# Patient Record
Sex: FEMALE | Race: WHITE | NOT HISPANIC OR LATINO | URBAN - METROPOLITAN AREA
[De-identification: names, ages, dates, MRNs, and addresses within clinical notes are randomized per-mention and may not be internally consistent; named-entity substitution may affect disease eponyms.]

---

## 2017-08-01 RX ORDER — NAPROXEN 500 MG/1
TABLET ORAL
Qty: 60 TABLET | Refills: 2 | OUTPATIENT
Start: 2017-08-01

## 2019-07-17 ENCOUNTER — APPOINTMENT (OUTPATIENT)
Dept: WOMENS IMAGING | Facility: HOSPITAL | Age: 39
End: 2019-07-17

## 2019-07-17 PROCEDURE — 77067 SCR MAMMO BI INCL CAD: CPT | Performed by: RADIOLOGY

## 2020-01-23 ENCOUNTER — OFFICE VISIT (OUTPATIENT)
Dept: SPORTS MEDICINE | Facility: CLINIC | Age: 40
End: 2020-01-23

## 2020-01-23 VITALS
HEIGHT: 63 IN | DIASTOLIC BLOOD PRESSURE: 70 MMHG | OXYGEN SATURATION: 98 % | WEIGHT: 119 LBS | BODY MASS INDEX: 21.09 KG/M2 | SYSTOLIC BLOOD PRESSURE: 110 MMHG | HEART RATE: 62 BPM

## 2020-01-23 DIAGNOSIS — G56.02 CARPAL TUNNEL SYNDROME OF LEFT WRIST: Primary | ICD-10-CM

## 2020-01-23 PROCEDURE — 99204 OFFICE O/P NEW MOD 45 MIN: CPT | Performed by: FAMILY MEDICINE

## 2020-01-23 RX ORDER — NAPROXEN 500 MG/1
500 TABLET ORAL 2 TIMES DAILY WITH MEALS
Qty: 60 TABLET | Refills: 2 | Status: SHIPPED | OUTPATIENT
Start: 2020-01-23 | End: 2020-04-14

## 2020-01-23 NOTE — PROGRESS NOTES
"Christi is a 39 y.o. year old female evaluation of a problem that is new to this examiner.    Chief Complaint   Patient presents with   • LT arm pain       History of Present Illness  HPI   Here today for left hand numbness.  She has baseline history of intermittent numbness and tingling in both hands, primarily present when she wakes up in the morning.  Yesterday she had a more aggressive workout with dumbbell cleans, hand rowing, has been working on increasing her  strength.  Last night developed increased numbness and pain in left wrist and hand radiating up the forearm.  Associated with some reduction in  strength and dexterity.  Moderately severe.  Seems to be improving somewhat in the past hour.  Denies specific injury.    I have reviewed the patient's medical, family, and social history in detail and updated the computerized patient record.    Review of Systems   Constitutional: Negative for fever.   Musculoskeletal:        Per HPI   Skin: Negative for rash and wound.   Neurological: Positive for weakness and numbness. Negative for tremors.   All other systems reviewed and are negative.      /70   Pulse 62   Ht 160 cm (62.99\")   Wt 54 kg (119 lb)   SpO2 98%   BMI 21.09 kg/m²      Physical Exam    Vital signs reviewed.   General: No acute distress.  Eyes: conjunctiva clear; pupils equally round and reactive  ENT: external ears and nose atraumatic; oropharynx clear  CV: no peripheral edema, 2+ distal pulses  Resp: normal respiratory effort, no use of accessory muscles  Skin: no rashes or wounds; normal turgor  Psych: mood and affect appropriate; recent and remote memory intact  Neuro: sensation to light touch intact bilateral hands.  Strength is normal and symmetric bilateral hands with , opposition, abduction    MSK Exam:  Ortho Exam  Left hand and wrist: No tenderness.  Normal appearance without visible swelling.  There is a distinctly positive Tinel at the carpal tunnel.  Positive " Phalen at 30 seconds.  Negative Tinel at the cubital tunnel.  Mild generalized soreness in the extensor and flexor bundles of the forearm.  Normal range of motion and strength of the hand and wrist.    Right hand and wrist has a very mildly positive Tinel at the carpal tunnel.  Negative Phalen at 1 minute.        Diagnoses and all orders for this visit:    Carpal tunnel syndrome of left wrist  -     naproxen (NAPROSYN) 500 MG tablet; Take 1 tablet by mouth 2 (Two) Times a Day With Meals.    Discussed the nature of carpal tunnel syndrome.  This appears to be acute based on overuse, likely has some Stacie synovitis in the flexor bundle of the carpal tunnel causing this.  We will treated with bracing and NSAIDs.  If she is not progressing rapidly will consider nerve conduction study, possible injection.      EMR Dragon/Transcription disclaimer:    Much of this encounter note is an electronic transcription/translation of spoken language to printed text.  The electronic translation of spoken language may permit erroneous, or at times, nonsensical words or phrases to be inadvertently transcribed.  Although I have reviewed the note for such errors some may still exist.

## 2020-02-17 ENCOUNTER — TELEPHONE (OUTPATIENT)
Dept: SPORTS MEDICINE | Facility: CLINIC | Age: 40
End: 2020-02-17

## 2020-02-17 NOTE — TELEPHONE ENCOUNTER
Pt called and stated that her apt was rescheduled from Friday and would like to know what her next steps are. ASHLEE

## 2020-02-27 NOTE — TELEPHONE ENCOUNTER
I am sorry that I was ill and we had to reschedule her appointment.  Next steps really depend on her progress over time.  If she is still having symptoms, we may need to do some further testing or perform an injection next.  If it is possible to work her in sooner by using an acute visit slot or double booking a physical, I am okay with that.

## 2020-04-14 DIAGNOSIS — G56.02 CARPAL TUNNEL SYNDROME OF LEFT WRIST: ICD-10-CM

## 2020-04-14 RX ORDER — NAPROXEN 500 MG/1
TABLET ORAL
Qty: 180 TABLET | Refills: 3 | Status: SHIPPED | OUTPATIENT
Start: 2020-04-14

## 2020-08-17 ENCOUNTER — TELEPHONE (OUTPATIENT)
Dept: SPORTS MEDICINE | Facility: CLINIC | Age: 40
End: 2020-08-17

## 2020-08-17 NOTE — TELEPHONE ENCOUNTER
Patient called in, wants to know if we can add the COVID antibody testing to her CPE labs she is having done on 09/03/2020?.      Thanks  Deborah

## 2020-08-17 NOTE — TELEPHONE ENCOUNTER
Patient called to make an appointment for a physical, the only visits I see are for sports medicine. She stated that she asked you to be her primary care doctor at one of her appointments years ago.   Will you see her for primary care?  Please advise, thank you.

## 2020-09-01 DIAGNOSIS — Z00.00 PREVENTATIVE HEALTH CARE: Primary | ICD-10-CM

## 2020-09-04 LAB
ALBUMIN SERPL-MCNC: 4.5 G/DL (ref 3.5–5.2)
ALBUMIN/GLOB SERPL: 3 G/DL
ALP SERPL-CCNC: 37 U/L (ref 39–117)
ALT SERPL-CCNC: 9 U/L (ref 1–33)
APPEARANCE UR: ABNORMAL
AST SERPL-CCNC: 13 U/L (ref 1–32)
BACTERIA #/AREA URNS HPF: ABNORMAL /HPF
BASOPHILS # BLD AUTO: 0.02 10*3/MM3 (ref 0–0.2)
BASOPHILS NFR BLD AUTO: 0.5 % (ref 0–1.5)
BILIRUB SERPL-MCNC: 0.5 MG/DL (ref 0–1.2)
BILIRUB UR QL STRIP: NEGATIVE
BUN SERPL-MCNC: 16 MG/DL (ref 6–20)
BUN/CREAT SERPL: 19.5 (ref 7–25)
CALCIUM SERPL-MCNC: 9.1 MG/DL (ref 8.6–10.5)
CHLORIDE SERPL-SCNC: 105 MMOL/L (ref 98–107)
CHOLEST SERPL-MCNC: 178 MG/DL (ref 0–200)
CHOLEST/HDLC SERPL: 2.54 {RATIO}
CO2 SERPL-SCNC: 23.5 MMOL/L (ref 22–29)
COLOR UR: YELLOW
CREAT SERPL-MCNC: 0.82 MG/DL (ref 0.57–1)
CRYSTALS URNS MICRO: ABNORMAL
EOSINOPHIL # BLD AUTO: 0.06 10*3/MM3 (ref 0–0.4)
EOSINOPHIL NFR BLD AUTO: 1.5 % (ref 0.3–6.2)
EPI CELLS #/AREA URNS HPF: >10 /HPF (ref 0–10)
ERYTHROCYTE [DISTWIDTH] IN BLOOD BY AUTOMATED COUNT: 12.8 % (ref 12.3–15.4)
GLOBULIN SER CALC-MCNC: 1.5 GM/DL
GLUCOSE SERPL-MCNC: 91 MG/DL (ref 65–99)
GLUCOSE UR QL: NEGATIVE
HCT VFR BLD AUTO: 34.4 % (ref 34–46.6)
HDLC SERPL-MCNC: 70 MG/DL (ref 40–60)
HGB BLD-MCNC: 11.9 G/DL (ref 12–15.9)
HGB UR QL STRIP: NEGATIVE
IMM GRANULOCYTES # BLD AUTO: 0 10*3/MM3 (ref 0–0.05)
IMM GRANULOCYTES NFR BLD AUTO: 0 % (ref 0–0.5)
KETONES UR QL STRIP: NEGATIVE
LDLC SERPL CALC-MCNC: 98 MG/DL (ref 0–100)
LEUKOCYTE ESTERASE UR QL STRIP: NEGATIVE
LYMPHOCYTES # BLD AUTO: 1.52 10*3/MM3 (ref 0.7–3.1)
LYMPHOCYTES NFR BLD AUTO: 37 % (ref 19.6–45.3)
MCH RBC QN AUTO: 31.6 PG (ref 26.6–33)
MCHC RBC AUTO-ENTMCNC: 34.6 G/DL (ref 31.5–35.7)
MCV RBC AUTO: 91.2 FL (ref 79–97)
MICRO URNS: ABNORMAL
MICRO URNS: ABNORMAL
MONOCYTES # BLD AUTO: 0.34 10*3/MM3 (ref 0.1–0.9)
MONOCYTES NFR BLD AUTO: 8.3 % (ref 5–12)
MUCOUS THREADS URNS QL MICRO: PRESENT /HPF
NEUTROPHILS # BLD AUTO: 2.17 10*3/MM3 (ref 1.7–7)
NEUTROPHILS NFR BLD AUTO: 52.7 % (ref 42.7–76)
NITRITE UR QL STRIP: NEGATIVE
NRBC BLD AUTO-RTO: 0 /100 WBC (ref 0–0.2)
PH UR STRIP: 7.5 [PH] (ref 5–7.5)
PLATELET # BLD AUTO: 274 10*3/MM3 (ref 140–450)
POTASSIUM SERPL-SCNC: 4.8 MMOL/L (ref 3.5–5.2)
PROT SERPL-MCNC: 6 G/DL (ref 6–8.5)
PROT UR QL STRIP: ABNORMAL
RBC # BLD AUTO: 3.77 10*6/MM3 (ref 3.77–5.28)
RBC #/AREA URNS HPF: ABNORMAL /HPF (ref 0–2)
SARS-COV-2 AB SERPL QL IA: NEGATIVE
SODIUM SERPL-SCNC: 138 MMOL/L (ref 136–145)
SP GR UR: 1.02 (ref 1–1.03)
TRIGL SERPL-MCNC: 50 MG/DL (ref 0–150)
TSH SERPL DL<=0.005 MIU/L-ACNC: 2.22 UIU/ML (ref 0.45–4.5)
UNIDENT CRYS URNS QL MICRO: PRESENT /LPF
URINALYSIS REFLEX: ABNORMAL
UROBILINOGEN UR STRIP-MCNC: 0.2 MG/DL (ref 0.2–1)
VLDLC SERPL CALC-MCNC: 10 MG/DL
WBC # BLD AUTO: 4.11 10*3/MM3 (ref 3.4–10.8)
WBC #/AREA URNS HPF: ABNORMAL /HPF (ref 0–5)

## 2020-09-08 ENCOUNTER — OFFICE VISIT (OUTPATIENT)
Dept: SPORTS MEDICINE | Facility: CLINIC | Age: 40
End: 2020-09-08

## 2020-09-08 VITALS
OXYGEN SATURATION: 99 % | DIASTOLIC BLOOD PRESSURE: 60 MMHG | TEMPERATURE: 98.4 F | SYSTOLIC BLOOD PRESSURE: 115 MMHG | HEART RATE: 62 BPM | HEIGHT: 63 IN | BODY MASS INDEX: 21.79 KG/M2 | WEIGHT: 123 LBS

## 2020-09-08 DIAGNOSIS — Z00.00 ANNUAL PHYSICAL EXAM: Primary | ICD-10-CM

## 2020-09-08 PROBLEM — M43.17 SPONDYLOLISTHESIS AT L5-S1 LEVEL: Status: ACTIVE | Noted: 2020-09-08

## 2020-09-08 PROCEDURE — 99396 PREV VISIT EST AGE 40-64: CPT | Performed by: FAMILY MEDICINE

## 2020-09-08 NOTE — PROGRESS NOTES
"Christi Joseph is here today for an annual physical exam.     Eating a healthy diet. Exercising routinely.   Thinks she had covid about 6 weeks ago - direct contact with neighbor who was hospitalized; 7-10 days later developed fatigue, headaches, brain fog, but no cough/SOA. Rest of family tested negative.  had similar illness.     PHQ-2 Depression Screening  Little interest or pleasure in doing things? 0   Feeling down, depressed, or hopeless? 0   PHQ-2 Total Score 0       Health Maintenance   Topic Date Due   • Annual Gynecologic Pelvic and Breast Exam  1980   • ANNUAL PHYSICAL  12/23/1981   • TDAP/TD VACCINES (1 - Tdap) 12/23/1989   • HEPATITIS C SCREENING  01/23/2020   • PAP SMEAR  01/23/2020   • INFLUENZA VACCINE  08/01/2020       Review of Systems   Constitutional: Negative.    Respiratory: Negative.    Cardiovascular: Negative.        /60 (BP Location: Right arm, Patient Position: Sitting, Cuff Size: Adult)   Pulse 62   Temp 98.4 °F (36.9 °C)   Ht 160 cm (62.99\")   Wt 55.8 kg (123 lb)   SpO2 99%   BMI 21.79 kg/m²      Physical Exam    Vital signs reviewed.  General appearance: No acute distress  Eyes: conjunctiva clear without erythema; pupils equally round and reactive  ENT: external ears and nose normal; hearing normal  Neck: supple; no thyromegaly  CV: normal rate and rhythm; no peripheral edema  Respiratory: normal respiratory effort; lungs clear to auscultation bilaterally  MSK: normal gait and station; no focal joint deformity or swelling  Skin: no rash or wounds; normal turgor  Neuro: cranial nerves 2-12 grossly intact; normal sensation to light touch  Psych: mood and affect normal; recent and remote memory intact    Orders Only on 09/01/2020   Component Date Value Ref Range Status   • Glucose 09/03/2020 91  65 - 99 mg/dL Final   • BUN 09/03/2020 16  6 - 20 mg/dL Final   • Creatinine 09/03/2020 0.82  0.57 - 1.00 mg/dL Final   • eGFR Non  Am 09/03/2020 77  >60 " mL/min/1.73 Final   • eGFR  Am 09/03/2020 93  >60 mL/min/1.73 Final   • BUN/Creatinine Ratio 09/03/2020 19.5  7.0 - 25.0 Final   • Sodium 09/03/2020 138  136 - 145 mmol/L Final   • Potassium 09/03/2020 4.8  3.5 - 5.2 mmol/L Final   • Chloride 09/03/2020 105  98 - 107 mmol/L Final   • Total CO2 09/03/2020 23.5  22.0 - 29.0 mmol/L Final   • Calcium 09/03/2020 9.1  8.6 - 10.5 mg/dL Final   • Total Protein 09/03/2020 6.0  6.0 - 8.5 g/dL Final   • Albumin 09/03/2020 4.50  3.50 - 5.20 g/dL Final   • Globulin 09/03/2020 1.5  gm/dL Final   • A/G Ratio 09/03/2020 3.0  g/dL Final   • Total Bilirubin 09/03/2020 0.5  0.0 - 1.2 mg/dL Final   • Alkaline Phosphatase 09/03/2020 37* 39 - 117 U/L Final   • AST (SGOT) 09/03/2020 13  1 - 32 U/L Final   • ALT (SGPT) 09/03/2020 9  1 - 33 U/L Final   • WBC 09/03/2020 4.11  3.40 - 10.80 10*3/mm3 Final   • RBC 09/03/2020 3.77  3.77 - 5.28 10*6/mm3 Final   • Hemoglobin 09/03/2020 11.9* 12.0 - 15.9 g/dL Final   • Hematocrit 09/03/2020 34.4  34.0 - 46.6 % Final   • MCV 09/03/2020 91.2  79.0 - 97.0 fL Final   • MCH 09/03/2020 31.6  26.6 - 33.0 pg Final   • MCHC 09/03/2020 34.6  31.5 - 35.7 g/dL Final   • RDW 09/03/2020 12.8  12.3 - 15.4 % Final   • Platelets 09/03/2020 274  140 - 450 10*3/mm3 Final   • Neutrophil Rel % 09/03/2020 52.7  42.7 - 76.0 % Final   • Lymphocyte Rel % 09/03/2020 37.0  19.6 - 45.3 % Final   • Monocyte Rel % 09/03/2020 8.3  5.0 - 12.0 % Final   • Eosinophil Rel % 09/03/2020 1.5  0.3 - 6.2 % Final   • Basophil Rel % 09/03/2020 0.5  0.0 - 1.5 % Final   • Neutrophils Absolute 09/03/2020 2.17  1.70 - 7.00 10*3/mm3 Final   • Lymphocytes Absolute 09/03/2020 1.52  0.70 - 3.10 10*3/mm3 Final   • Monocytes Absolute 09/03/2020 0.34  0.10 - 0.90 10*3/mm3 Final   • Eosinophils Absolute 09/03/2020 0.06  0.00 - 0.40 10*3/mm3 Final   • Basophils Absolute 09/03/2020 0.02  0.00 - 0.20 10*3/mm3 Final   • Immature Granulocyte Rel % 09/03/2020 0.0  0.0 - 0.5 % Final   • Immature Grans  Absolute 09/03/2020 0.00  0.00 - 0.05 10*3/mm3 Final   • nRBC 09/03/2020 0.0  0.0 - 0.2 /100 WBC Final   • Total Cholesterol 09/03/2020 178  0 - 200 mg/dL Final   • Triglycerides 09/03/2020 50  0 - 150 mg/dL Final   • HDL Cholesterol 09/03/2020 70* 40 - 60 mg/dL Final   • VLDL Cholesterol Fausto 09/03/2020 10  mg/dL Final   • LDL Chol Calc (NIH) 09/03/2020 98  0 - 100 mg/dL Final   • Chol/HDL Ratio 09/03/2020 2.54   Final   • Specific Gravity, UA 09/03/2020 1.021  1.005 - 1.030 Final   • pH, UA 09/03/2020 7.5  5.0 - 7.5 Final   • Color, UA 09/03/2020 Yellow  Yellow Final   • Appearance, UA 09/03/2020 Turbid* Clear Final   • Leukocytes, UA 09/03/2020 Negative  Negative Final   • Protein 09/03/2020 Trace  Negative/Trace Final   • Glucose, UA 09/03/2020 Negative  Negative Final   • Ketones 09/03/2020 Negative  Negative Final   • Blood, UA 09/03/2020 Negative  Negative Final   • Bilirubin, UA 09/03/2020 Negative  Negative Final   • Urobilinogen, UA 09/03/2020 0.2  0.2 - 1.0 mg/dL Final   • Nitrite, UA 09/03/2020 Negative  Negative Final   • Microscopic Examination 09/03/2020 Comment   Final    Microscopic follows if indicated.   • Microscopic Examination 09/03/2020 See below:   Final    Microscopic was indicated and was performed.   • Urinalysis Reflex 09/03/2020 Comment   Final    This specimen will not reflex to a Urine Culture.   • TSH 09/03/2020 2.220  0.450 - 4.500 uIU/mL Final    Comment: No apparent thyroid disorder. Additional testing not indicated. In  rare instances, Secondary Hypothyroidism as well as Subclinical  Hypothyroidism have been reported in some patients with normal TSH  values.     • SARS-COV-2 ANTIBODIES 09/03/2020 Negative  Negative Final    Comment: This sample does not contain detectable SARS-CoV-2 antibodies. This  negative result does not rule out SARS-CoV-2 infection. Correlation  with epidemiologic risk factors and other clinical and laboratory  findings is recommended. Serologic results  should not be used as the  sole basis to diagnose or exclude recent SARS-CoV-2 infection.     • WBC, UA 09/03/2020 0-5  0 - 5 /hpf Final   • RBC, UA 09/03/2020 0-2  0 - 2 /hpf Final   • Epithelial Cells (non renal) 09/03/2020 >10* 0 - 10 /hpf Final   • Crystals, UA 09/03/2020 Present* N/A /lpf Final   • Crystal Type 09/03/2020 Amorphous Sediment  N/A Final   • Mucus, UA 09/03/2020 Present  Not Estab. /hpf Final   • Bacteria, UA 09/03/2020 Few  None seen/Few /hpf Final         Current Outpatient Medications:   •  naproxen (NAPROSYN) 500 MG tablet, TAKE 1 TABLET BY MOUTH TWICE A DAY WITH MEALS, Disp: 180 tablet, Rfl: 3    Christi was seen today for annual exam.    Diagnoses and all orders for this visit:    Annual physical exam        Encourage healthy diet and exercise.  Encourage patient to stay up to date on screening examinations as indicated based on age and risk factors.  Reviewed labs, all reassuring.  Continue routine screening with OB/GYN as well.    EMR Dragon/Transcription disclaimer:    Much of this encounter note is an electronic transcription/translation of spoken language to printed text.  The electronic translation of spoken language may permit erroneous, or at times, nonsensical words or phrases to be inadvertently transcribed.  Although I have reviewed the note for such errors some may still exist.

## 2021-03-15 ENCOUNTER — APPOINTMENT (OUTPATIENT)
Dept: WOMENS IMAGING | Facility: HOSPITAL | Age: 41
End: 2021-03-15

## 2021-03-15 PROCEDURE — 77067 SCR MAMMO BI INCL CAD: CPT | Performed by: RADIOLOGY

## 2021-03-15 PROCEDURE — 77063 BREAST TOMOSYNTHESIS BI: CPT | Performed by: RADIOLOGY

## 2021-04-02 ENCOUNTER — BULK ORDERING (OUTPATIENT)
Dept: CASE MANAGEMENT | Facility: OTHER | Age: 41
End: 2021-04-02

## 2021-04-02 DIAGNOSIS — Z23 IMMUNIZATION DUE: ICD-10-CM

## 2022-03-29 ENCOUNTER — APPOINTMENT (OUTPATIENT)
Dept: WOMENS IMAGING | Facility: HOSPITAL | Age: 42
End: 2022-03-29

## 2022-03-29 PROCEDURE — 77067 SCR MAMMO BI INCL CAD: CPT | Performed by: RADIOLOGY

## 2022-03-29 PROCEDURE — 77063 BREAST TOMOSYNTHESIS BI: CPT | Performed by: RADIOLOGY
